# Patient Record
Sex: FEMALE
[De-identification: names, ages, dates, MRNs, and addresses within clinical notes are randomized per-mention and may not be internally consistent; named-entity substitution may affect disease eponyms.]

---

## 2018-03-20 ENCOUNTER — HOSPITAL ENCOUNTER (EMERGENCY)
Dept: HOSPITAL 31 - C.ER | Age: 38
Discharge: HOME | End: 2018-03-20
Payer: COMMERCIAL

## 2018-03-20 VITALS
RESPIRATION RATE: 20 BRPM | TEMPERATURE: 98.5 F | SYSTOLIC BLOOD PRESSURE: 113 MMHG | OXYGEN SATURATION: 98 % | HEART RATE: 71 BPM | DIASTOLIC BLOOD PRESSURE: 76 MMHG
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
## 2018-03-20 DIAGNOSIS — S61.012A: Primary | ICD-10-CM
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
## 2018-03-20 NOTE — C.PDOC
History Of Present Illness


39 yo female c/o left hand laceration just prior to arrival. Pt notes that her 

 was standing in the kitchen holding a knife, she turned and he 

accidentally cut her hand. No change in sensation. Right hand dominant. Pt is 

pregnant. 


Time Seen by Provider: 03/20/18 21:57


Chief Complaint (Nursing): Abnormal Skin Integrity


History Per: Patient


History/Exam Limitations: no limitations


Onset/Duration Of Symptoms: Mins





Past Medical History


Vital Signs: 


 Last Vital Signs











Temp  98.5 F   03/20/18 21:20


 


Pulse  71   03/20/18 21:20


 


Resp  20   03/20/18 22:32


 


BP  113/76   03/20/18 21:20


 


Pulse Ox  98   03/21/18 16:27











Family History: States: Unknown Family Hx





- Social History


Hx Alcohol Use: No


Hx Substance Use: No





- Immunization History


Hx Tetanus Toxoid Vaccination: Yes


Hx Influenza Vaccination: Yes


Hx Pneumococcal Vaccination: Yes





Review Of Systems


Constitutional: Negative for: Fever


Cardiovascular: Negative for: Chest Pain


Respiratory: Negative for: Shortness of Breath


Neurological: Negative for: Weakness, Numbness





Physical Exam





- Physical Exam


Appears: Well, Non-toxic, No Acute Distress


Skin: Warm, Dry, Other ((+) 2 cm laceration to the left 1st proximal phalanx)


Head: Atraumatic, Normacephalic


Eye(s): bilateral: Normal Inspection, EOMI


Nose: Normal


Oral Mucosa: Moist


Throat: Normal


Neck: Normal


Chest: Symmetrical


Respiratory: No Accessory Muscle Use


Back: Normal Inspection


Extremity: Normal ROM, Capillary Refill (<2 sec)


Pulses: Left Radial: Normal, Right Radial: Normal


Neurological/Psych: Oriented x3, Normal Motor (5/5 against resistance), Normal 

Sensation





ED Course And Treatment


O2 Sat by Pulse Oximetry: 98 (RA)


Pulse Ox Interpretation: Normal


Progress Note: Laceration repaired without difficulty (see procedure note). 

Bacitracin and sterile dressing applied. Patient advised to follow up in 2 days 

for wound check and 7-10 for suture removal. Educated patient on wound care, 

all questions answered. Return precautions discussed, patient is understanding 

of discharge plan.





Laceration





- Laceration Repair


  ** Left finger laceration


Wound Length (In cm): 2


Description Of Wound: Linear


Wound Cleansed With: Betadine


Anesthesia: Lidocaine 2%


Wound Examination: Irrigated With Saline, No FB With Wound Exploration, No 

Tendon Injury With Wound Exploration


Wound Closure: Suture (x3)


Suture Technique And Material Used: Nylon (5:0)


Wound Complexity: Simple





Disposition





- Disposition


Disposition: HOME/ ROUTINE


Disposition Time: 22:16


Condition: STABLE


Additional Instructions: 


Wound check in 2 days. Suture removal in 7 -10 days. Watch for signs of 

infection including redness, swelling, or discharge. 


Instructions:  Laceration Repair With Stitches (DC)


Forms:  [facility] (English)





- Clinical Impression


Clinical Impression: 


 Laceration of hand